# Patient Record
Sex: MALE | Race: WHITE | NOT HISPANIC OR LATINO
[De-identification: names, ages, dates, MRNs, and addresses within clinical notes are randomized per-mention and may not be internally consistent; named-entity substitution may affect disease eponyms.]

---

## 2019-11-27 ENCOUNTER — APPOINTMENT (OUTPATIENT)
Dept: ORTHOPEDIC SURGERY | Facility: CLINIC | Age: 49
End: 2019-11-27
Payer: COMMERCIAL

## 2019-11-27 VITALS — DIASTOLIC BLOOD PRESSURE: 90 MMHG | SYSTOLIC BLOOD PRESSURE: 160 MMHG | HEART RATE: 77 BPM

## 2019-11-27 VITALS
HEIGHT: 70 IN | SYSTOLIC BLOOD PRESSURE: 165 MMHG | BODY MASS INDEX: 35.79 KG/M2 | HEART RATE: 83 BPM | DIASTOLIC BLOOD PRESSURE: 92 MMHG | WEIGHT: 250 LBS

## 2019-11-27 DIAGNOSIS — S99.921A UNSPECIFIED INJURY OF RIGHT FOOT, INITIAL ENCOUNTER: ICD-10-CM

## 2019-11-27 PROCEDURE — 99203 OFFICE O/P NEW LOW 30 MIN: CPT

## 2019-11-27 NOTE — PHYSICAL EXAM
[de-identified] : Extremity: +Equinus (releases) R LE, soft tissue swelling R hallux with ecchymosis and associated tenderness plantar aspect first MTP joint, flickers FHL and EHL R hallux, stable varus / valgus stress testing first MTP joint.  Nontender R ankle, peroneals, syndesmosis, Achilles, hindfoot ST, midfoot LF and PTT insertional, and remainder of forefoot.  Stable Drawer testing R ankle, 5 / 5 evertor strength R ankle, calves soft and nontender, sensorimotor unchanged, skin intact as aforementioned R LE.  AOx3, mood / affect normal. [de-identified] : Xray R foot (11/26/19 by report): Impression: lucency distal phalanx first digit. This is in favor of being projectional. Fx is felt unlikely. If however the patient has pain localized to this area, follow-up study in 7 days may prove helpful [by review: bipartite lateral sesamoid, chronic deformity DP second digit R forefoot].
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
## 2019-11-27 NOTE — DISCUSSION/SUMMARY
[de-identified] : Discussed with patient potential nature of condition, course / sequelae, options reviewed.  Cam boot immobilization and Cam boot ambulation, activity modification, HEP, MRI assessment turf toe injury rule-out tear / bony impaction.  Return to office in 1 week / PRN, advanced imaging study review.  All questions answered.
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
## 2019-12-02 ENCOUNTER — FORM ENCOUNTER (OUTPATIENT)
Age: 49
End: 2019-12-02

## 2019-12-03 ENCOUNTER — OUTPATIENT (OUTPATIENT)
Dept: OUTPATIENT SERVICES | Facility: HOSPITAL | Age: 49
LOS: 1 days | End: 2019-12-03
Payer: COMMERCIAL

## 2019-12-03 ENCOUNTER — APPOINTMENT (OUTPATIENT)
Dept: MRI IMAGING | Facility: IMAGING CENTER | Age: 49
End: 2019-12-03
Payer: COMMERCIAL

## 2019-12-03 DIAGNOSIS — S99.921A UNSPECIFIED INJURY OF RIGHT FOOT, INITIAL ENCOUNTER: ICD-10-CM

## 2019-12-03 PROCEDURE — 73718 MRI LOWER EXTREMITY W/O DYE: CPT

## 2019-12-03 PROCEDURE — 73718 MRI LOWER EXTREMITY W/O DYE: CPT | Mod: 26,RT

## 2019-12-10 ENCOUNTER — APPOINTMENT (OUTPATIENT)
Dept: ORTHOPEDIC SURGERY | Facility: CLINIC | Age: 49
End: 2019-12-10
Payer: COMMERCIAL

## 2019-12-10 PROCEDURE — 99213 OFFICE O/P EST LOW 20 MIN: CPT

## 2019-12-15 NOTE — DISCUSSION/SUMMARY
[de-identified] : Options reviewed, Cam boot immobilization at all times and Cam boot ambulation again instructed, activity modification, HEP.  Return to office in 3 - 4 weeks / PRN, all questions answered.

## 2019-12-15 NOTE — HISTORY OF PRESENT ILLNESS
[FreeTextEntry1] : Follow-up R great toe hyperextension / turf toe injury (11/26/19), wearing sneakers for ambulation NC.

## 2019-12-15 NOTE — PHYSICAL EXAM
[Normal] : Oriented to person, place, and time, insight and judgement were intact and the affect was normal [de-identified] : Extremity: +Equinus (releases) R LE, decreased residual soft tissue swelling R hallux and decreased tenderness plantar aspect first MTP joint, flickers FHL and EHL R foot, stable varus / valgus stress testing first MTP joint. Nontender R ankle, peroneals, syndesmosis, Achilles, hindfoot ST, midfoot LF and PTT insertional, and remainder of forefoot. Stable Drawer testing R ankle, 5 / 5 evertor strength R ankle, calves soft and nontender, sensorimotor unchanged, skin intact as aforementioned R LE.  AOx3, mood / affect normal. [de-identified] : DAVID, NAD [de-identified] : MRI (by report 12/3/19): findings suggestive of rupture of sesamoidal - phalangeal ligaments of the first MTP joint distal to both sesamoids, capsular strain, split tearing of the flexor hallucis tendon with tenosynovitis, first MTP joint capsular strain, bipartite fibular sesamoid, small focus of deep chondral fissuring at the first metatarsal head, small first webspace bursitis R forefoot.

## 2019-12-31 ENCOUNTER — APPOINTMENT (OUTPATIENT)
Dept: ORTHOPEDIC SURGERY | Facility: CLINIC | Age: 49
End: 2019-12-31
Payer: COMMERCIAL

## 2019-12-31 PROCEDURE — 99213 OFFICE O/P EST LOW 20 MIN: CPT

## 2020-01-30 ENCOUNTER — APPOINTMENT (OUTPATIENT)
Dept: ORTHOPEDIC SURGERY | Facility: CLINIC | Age: 50
End: 2020-01-30
Payer: COMMERCIAL

## 2020-01-30 DIAGNOSIS — S93.529D SPRAIN OF METATARSOPHALANGEAL JOINT OF UNSPECIFIED TOE(S), SUBSEQUENT ENCOUNTER: ICD-10-CM

## 2020-01-30 DIAGNOSIS — M21.6X1 OTHER ACQUIRED DEFORMITIES OF RIGHT FOOT: ICD-10-CM

## 2020-01-30 PROCEDURE — 99213 OFFICE O/P EST LOW 20 MIN: CPT

## 2020-03-30 ENCOUNTER — APPOINTMENT (OUTPATIENT)
Dept: ORTHOPEDIC SURGERY | Facility: CLINIC | Age: 50
End: 2020-03-30

## 2020-05-26 ENCOUNTER — APPOINTMENT (OUTPATIENT)
Dept: ORTHOPEDIC SURGERY | Facility: CLINIC | Age: 50
End: 2020-05-26

## 2020-06-09 ENCOUNTER — APPOINTMENT (OUTPATIENT)
Dept: ORTHOPEDIC SURGERY | Facility: CLINIC | Age: 50
End: 2020-06-09

## 2021-09-09 ENCOUNTER — APPOINTMENT (OUTPATIENT)
Dept: BARIATRICS/WEIGHT MGMT | Facility: CLINIC | Age: 51
End: 2021-09-09
Payer: COMMERCIAL

## 2021-09-09 ENCOUNTER — TRANSCRIPTION ENCOUNTER (OUTPATIENT)
Age: 51
End: 2021-09-09

## 2021-09-09 PROCEDURE — 99205 OFFICE O/P NEW HI 60 MIN: CPT | Mod: 95

## 2021-09-09 NOTE — ASSESSMENT
[FreeTextEntry1] : -counseling provided at length\par -is interested in fasting, some dietary changes\par -will start GLP1\par -begin some activity once momentum starts\par -fasting guide provided\par -sleep study\par \par Bariatric surgery history: none\par Overweight / obesity comorbidities: hld jayme htn\par Anti-obesity medications: none\par Obesity medication side effects: n/a\par
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
## 2021-09-23 ENCOUNTER — APPOINTMENT (OUTPATIENT)
Dept: BARIATRICS/WEIGHT MGMT | Facility: CLINIC | Age: 51
End: 2021-09-23
Payer: COMMERCIAL

## 2021-09-23 PROCEDURE — 99213 OFFICE O/P EST LOW 20 MIN: CPT | Mod: 95

## 2021-09-24 NOTE — ASSESSMENT
[FreeTextEntry1] : -increase ozempic after 4 doses\par -continue lifestyle changes\par -difficult to touch upon food today\par -activity is a must\par -needs sleep study, we discussed\par \par Bariatric surgery history: none\par Overweight / obesity comorbidities: htn hld jayme\par Anti-obesity medications: ozempic\par Obesity medication side effects: none\par

## 2021-09-24 NOTE — HISTORY OF PRESENT ILLNESS
[Home] : at home, [unfilled] , at the time of the visit. [Other Location: e.g. Home (Enter Location, City,State)___] : at [unfilled] [Verbal consent obtained from patient] : the patient, [unfilled] [FreeTextEntry1] : Patient presents for weight loss and overweight/obese comorbidity management\par \par Mihir is feeling good on ozempic\par he is fitting into old clothes\par has not weighed self\par diet is lower in fat and higher in vegetables\par has not increased activity\par is asking about dose increase on ozempic\par feels the effect has worn off by end of week\par no side effects\par \par Due to comorbidities this patient requires medical treatment for overweight / obesity\par

## 2021-10-13 ENCOUNTER — APPOINTMENT (OUTPATIENT)
Dept: BARIATRICS/WEIGHT MGMT | Facility: CLINIC | Age: 51
End: 2021-10-13
Payer: COMMERCIAL

## 2021-10-13 PROCEDURE — 99443: CPT

## 2021-10-18 NOTE — HISTORY OF PRESENT ILLNESS
[Home] : at home, [unfilled] , at the time of the visit. [Other Location: e.g. Home (Enter Location, City,State)___] : at [unfilled] [Verbal consent obtained from patient] : the patient, [unfilled] [FreeTextEntry1] : Patient presents for weight loss and overweight/obese comorbidity management\par \par Mihir has lost ~12 lbs\par he has reduced fat intake\par specific details are not very clear\par has not started exercise yet\par feels the effect of semaglutide waning but working\par reports regular indiscretions with diet due to holidays\par made appointment with sleep medicine for follow up study\par \par Due to comorbidities this patient requires medical treatment for overweight / obesity\par

## 2021-10-18 NOTE — ASSESSMENT
[FreeTextEntry1] : -will continue pharmacological treatment; switching to wegovy if possible\par -need to discuss diet and lifestyle changes in more detail; motivation may be low; free time may be a factor\par \par Bariatric surgery history: none\par Overweight / obesity comorbidities: htn hld jayme\par Anti-obesity medications: semaglutide\par Obesity medication side effects: none\par

## 2021-11-10 ENCOUNTER — APPOINTMENT (OUTPATIENT)
Dept: BARIATRICS/WEIGHT MGMT | Facility: CLINIC | Age: 51
End: 2021-11-10
Payer: COMMERCIAL

## 2021-11-10 DIAGNOSIS — R06.83 SNORING: ICD-10-CM

## 2021-11-10 PROCEDURE — 99214 OFFICE O/P EST MOD 30 MIN: CPT | Mod: 95

## 2021-11-10 NOTE — ASSESSMENT
[FreeTextEntry1] : -continue wegovy\par -add exercise - 2x per week eliptical\par -weight self for feedback\par -will discuss diet in more detail next month\par \par Bariatric surgery history: none\par Overweight / obesity comorbidities: htn hld\par Anti-obesity medications: wegovy\par Obesity medication side effects: none\par

## 2021-11-10 NOTE — HISTORY OF PRESENT ILLNESS
[Home] : at home, [unfilled] , at the time of the visit. [Other Location: e.g. Home (Enter Location, City,State)___] : at [unfilled] [Verbal consent obtained from patient] : the patient, [unfilled] [FreeTextEntry1] : Patient presents for weight loss and overweight/obese comorbidity management\par \par Mihir has reduced portions with help of semaglutide\par just started at higher dose 1.0 now wegovy\par plans to start exercise routine this month\par has elipitcal machine\par wants to drill down about diet next month\par still needs to schedule sleep study\par \par Due to comorbidities this patient requires medical treatment for overweight / obesity\par

## 2021-12-01 RX ORDER — SEMAGLUTIDE 1.34 MG/ML
2 INJECTION, SOLUTION SUBCUTANEOUS
Qty: 1 | Refills: 2 | Status: DISCONTINUED | COMMUNITY
Start: 2021-09-14 | End: 2021-12-01

## 2021-12-15 ENCOUNTER — APPOINTMENT (OUTPATIENT)
Dept: BARIATRICS/WEIGHT MGMT | Facility: CLINIC | Age: 51
End: 2021-12-15
Payer: COMMERCIAL

## 2021-12-15 PROCEDURE — 99214 OFFICE O/P EST MOD 30 MIN: CPT | Mod: 95

## 2021-12-16 NOTE — ASSESSMENT
[FreeTextEntry1] : -increasing wegovy to 1.7, as desired\par -reviewed explicitly concerns about taking medications without making other lifestyle changes\par -Mihir reports taking his time with these other measures\par -I stressed again the importance of a sleep study\par \par Bariatric surgery history: none\par Overweight / obesity comorbidities: htn jayme\par Anti-obesity medications: wegovy\par Obesity medication side effects: none\par

## 2021-12-16 NOTE — HISTORY OF PRESENT ILLNESS
[Home] : at home, [unfilled] , at the time of the visit. [Other Location: e.g. Home (Enter Location, City,State)___] : at [unfilled] [Verbal consent obtained from patient] : the patient, [unfilled] [FreeTextEntry1] : Patient presents for weight loss and overweight/obese comorbidity management\par \par Mihir has lost 8 lbs being on wegovy 1.0\par has improved diet somewhat, although exact details not forthcoming\par asks about higher dose of ozempic\par still has not gotten sleep study\par exercise has not yet begun\par \par Due to comorbidities this patient requires medical treatment for overweight / obesity\par

## 2022-01-12 ENCOUNTER — APPOINTMENT (OUTPATIENT)
Dept: BARIATRICS/WEIGHT MGMT | Facility: CLINIC | Age: 52
End: 2022-01-12
Payer: COMMERCIAL

## 2022-01-12 PROCEDURE — 99214 OFFICE O/P EST MOD 30 MIN: CPT | Mod: 95

## 2022-01-12 NOTE — HISTORY OF PRESENT ILLNESS
[Home] : at home, [unfilled] , at the time of the visit. [Other Location: e.g. Home (Enter Location, City,State)___] : at [unfilled] [FreeTextEntry1] : Patient presents for weight loss and overweight/obese comorbidity management\par \par Mihir has lost a total of 17 lbs\par reports holidays were tough\par did not gain\par not yet exercising\par and reports eating chinese food and not having changed diet that drastically, just eating less\par has not yet gotten sleep study\par \par Due to comorbidities this patient requires medical treatment for overweight / obesity\par

## 2022-01-12 NOTE — ASSESSMENT
[FreeTextEntry1] : -we discussed the importance of exercise and saturated fat reduction, insulin resistance\par -holding wegovy for now\par -work on lifestyle measures, slow steady progress\par -close follow up still\par -continue to strongly advise getting sleep study, suspect jayme\par \par Bariatric surgery history: none\par Overweight / obesity comorbidities: htn hld\par Anti-obesity medications: wegovy\par Obesity medication side effects: none\par

## 2022-02-09 ENCOUNTER — APPOINTMENT (OUTPATIENT)
Dept: BARIATRICS/WEIGHT MGMT | Facility: CLINIC | Age: 52
End: 2022-02-09
Payer: COMMERCIAL

## 2022-02-09 PROCEDURE — 99213 OFFICE O/P EST LOW 20 MIN: CPT | Mod: 95

## 2022-02-09 NOTE — ASSESSMENT
[FreeTextEntry1] : -continue wegovy at current dose\par -will discuss medical options in future\par -will discuss lifestyle measures when Mihir is ready\par -I'll continue to monitor mainly for medicaiton side effects\par \par Bariatric surgery history: none\par Overweight / obesity comorbidities: hld htn jayme\par Anti-obesity medications: wegovy\par Obesity medication side effects: nausea\par

## 2022-02-09 NOTE — HISTORY OF PRESENT ILLNESS
[Home] : at home, [unfilled] , at the time of the visit. [Other Location: e.g. Home (Enter Location, City,State)___] : at [unfilled] [Verbal consent obtained from patient] : the patient, [unfilled] [FreeTextEntry1] : Patient presents for weight loss and overweight/obese comorbidity management\par \par Mihir has lost 25 lbs\par has been mainly through reduction in calorie intake from wegovy\par \par wegovy working well, however some severe nausea\par \par he is aware of long term plan requiring additional measures, like lifestyle\par has questions about additional medications for weight loss\par prefers to continue to treat medically and eventually get to other changes\par  he is aware of my take that this is not the recommended approach\par \par Due to comorbidities this patient requires medical treatment for overweight / obesity\par

## 2022-03-09 ENCOUNTER — APPOINTMENT (OUTPATIENT)
Dept: BARIATRICS/WEIGHT MGMT | Facility: CLINIC | Age: 52
End: 2022-03-09
Payer: COMMERCIAL

## 2022-03-09 PROCEDURE — 99214 OFFICE O/P EST MOD 30 MIN: CPT | Mod: 95

## 2022-03-09 NOTE — HISTORY OF PRESENT ILLNESS
[Home] : at home, [unfilled] , at the time of the visit. [Other Location: e.g. Home (Enter Location, City,State)___] : at [unfilled] [Verbal consent obtained from patient] : the patient, [unfilled] [FreeTextEntry1] : Patient presents for weight loss and overweight/obese comorbidity management\par \par Mihir reports still strong appetite control\par on full dose ozempic, eating 1/2 portion used to\par diet has not changed in composition\par has not yet started regular activity/exercise\par sleep study deferred by Mihir for now\par \par Due to comorbidities this patient requires medical treatment for overweight / obesity\par

## 2022-03-09 NOTE — ASSESSMENT
[FreeTextEntry1] : The follow plan was devised in discussion with the patient. This plan addresses this patient's overweight / obesity as well as the following medical comorbidities of overweight / obesity: hypertension hyperlipidemia \par -continue wegovy 2.4 mg weekly, will reassess next month\par -I will continue to urge lifestyle changes, and sleep study\par -may consider additional medications \par \par Bariatric surgery history: none\par Overweight / obesity comorbidities: hypertension hyperlipidemia \par Anti-obesity medications: wegovy\par Obesity medication side effects: nausea\par

## 2022-05-04 ENCOUNTER — APPOINTMENT (OUTPATIENT)
Dept: BARIATRICS/WEIGHT MGMT | Facility: CLINIC | Age: 52
End: 2022-05-04
Payer: COMMERCIAL

## 2022-05-04 PROCEDURE — 99214 OFFICE O/P EST MOD 30 MIN: CPT | Mod: 95

## 2022-05-06 NOTE — HISTORY OF PRESENT ILLNESS
[Home] : at home, [unfilled] , at the time of the visit. [Other Location: e.g. Home (Enter Location, City,State)___] : at [unfilled] [Verbal consent obtained from patient] : the patient, [unfilled] [FreeTextEntry1] : Patient presents for weight loss and overweight/obese comorbidity management\par \par Mihir has maintained ~30 lb weight loss\par now reach a plateau\par so far diet and exercise changes have been minimal\par has been more healthful over past week, vegetables, salmon\par asks about additional medications\par tolerating wegovy 2.7 mg, effect is waning\par \par still plans to get sleep study\par \par Due to comorbidities this patient requires medical treatment for overweight / obesity\par

## 2022-05-06 NOTE — ASSESSMENT
[FreeTextEntry1] : The following plan was agreed upon in discussion with this patient. This plan addresses this patient's overweight / obesity as well as the following medical comorbidities of overweight / obesity: hypertension, hyperlipidemia, obstructive sleep apnea \par -reviewed nutrition concepts again\par -adding phentermine/topiramate\par I discussed with the patient the indications, contraindications, insurance coverage issues, risks, benefits, alternatives, adverse effects / side effects, rare adverse events, reasons to stop immediately, reasons to go to the emergency room; also I have provided this patient my phone number to call if anything concerning occurs; the patient stated understanding of these issues\par -add exercise\par -get sleep study\par \par Bariatric surgery history: none\par Overweight / obesity comorbidities:  hypertension, hyperlipidemia, obstructive sleep apnea \par Anti-obesity medications: wegovy\par Obesity medication side effects: none\par \par

## 2022-05-18 ENCOUNTER — APPOINTMENT (OUTPATIENT)
Dept: BARIATRICS/WEIGHT MGMT | Facility: CLINIC | Age: 52
End: 2022-05-18
Payer: COMMERCIAL

## 2022-05-18 PROCEDURE — 99214 OFFICE O/P EST MOD 30 MIN: CPT | Mod: 95

## 2022-05-19 NOTE — HISTORY OF PRESENT ILLNESS
[Home] : at home, [unfilled] , at the time of the visit. [Other Location: e.g. Home (Enter Location, City,State)___] : at [unfilled] [Verbal consent obtained from patient] : the patient, [unfilled] [FreeTextEntry1] : Patient presents for weight loss and overweight/obese comorbidity management\par \par Mihir reports 30 lb weight loss\par a few addition lbs loss since last visit\par tolerating wegovy and phentermine, topiramate\par aim is another 20 lb weight loss prior to upcoming family event\par diet and other lifestyle changes have been limited\par has not obtained sleep study\par is aware that additional measures besides meds are needed to reach his goalw\par \par Due to comorbidities this patient requires medical treatment for overweight / obesity\par

## 2022-05-19 NOTE — ASSESSMENT
[FreeTextEntry1] : The following plan was agreed upon in discussion with this patient. This plan addresses this patient's overweight / obesity as well as the following medical comorbidities of overweight / obesity: hypertension hyperlipidemia\par -asked to check blood pressure at home while on phentermine\par -increase phentermine and topiramate to 37.5 / 50 mg\par -advised add dietary changes and activity to reach goal\par -still encouraging follow up with sleep study\par \par Bariatric surgery history: none\par Overweight / obesity comorbidities: hypertension hyperlipidemia\par Anti-obesity medications: wegovy phentermine topiramate\par Obesity medication side effects: none\par \par

## 2022-06-15 ENCOUNTER — APPOINTMENT (OUTPATIENT)
Dept: BARIATRICS/WEIGHT MGMT | Facility: CLINIC | Age: 52
End: 2022-06-15
Payer: COMMERCIAL

## 2022-06-15 PROCEDURE — 99214 OFFICE O/P EST MOD 30 MIN: CPT | Mod: 95

## 2022-06-20 NOTE — ASSESSMENT
[FreeTextEntry1] : The following plan was agreed upon in discussion with this patient. This plan addresses this patient's overweight / obesity as well as the following medical comorbidities of overweight / obesity: hypertension, hyperlipidemia\par -continue wegovy, phen/tpm\par -new resources provided re: processed food, insulin resistance\par -encouraged continuing lifestyle changes\par \par Bariatric surgery history: none\par Overweight / obesity comorbidities: hypertension, hyperlipidemia\par Anti-obesity medications: wegovy qsymia\par Obesity medication side effects: none\par

## 2022-06-20 NOTE — HISTORY OF PRESENT ILLNESS
[Home] : at home, [unfilled] , at the time of the visit. [Other Location: e.g. Home (Enter Location, City,State)___] : at [unfilled] [Verbal consent obtained from patient] : the patient, [unfilled] [FreeTextEntry1] : Patient presents for weight loss and overweight/obese comorbidity management\par \par Mihir has maintained ~30 lb weight loss\par by dietary changes and wegovy, phen/tpm\par further lifestyle changes have been limited at this time\par tolerating medication with no side effects\par \par Due to comorbidities this patient requires medical treatment for overweight / obesity\par

## 2022-07-20 ENCOUNTER — APPOINTMENT (OUTPATIENT)
Dept: BARIATRICS/WEIGHT MGMT | Facility: CLINIC | Age: 52
End: 2022-07-20

## 2022-07-20 DIAGNOSIS — R11.0 NAUSEA: ICD-10-CM

## 2022-07-20 PROCEDURE — 99214 OFFICE O/P EST MOD 30 MIN: CPT | Mod: 95

## 2022-07-22 NOTE — HISTORY OF PRESENT ILLNESS
[Home] : at home, [unfilled] , at the time of the visit. [Other Location: e.g. Home (Enter Location, City,State)___] : at [unfilled] [Verbal consent obtained from patient] : the patient, [unfilled] [FreeTextEntry1] : Patient presents for weight loss and overweight/obese comorbidity management\par \par Mihir continues to lose weight, now ~35 lb weight loss\par dietary changes have been modest but improving\par has not dedicated exercise yet\par generally tolerating phen/tpm and wegovy at max doses\par severe nausea on first day of wegovy, sometimes emesis\par inquires about new WL med terzipatide\par \par Due to comorbidities this patient requires medical treatment for overweight / obesity\par

## 2022-07-22 NOTE — ASSESSMENT
[FreeTextEntry1] : The following plan was agreed upon in discussion with this patient. This plan addresses this patient's overweight / obesity as well as the following medical comorbidities of overweight / obesity: hypertension hyperlipidemia obstructive sleep apnea\par \par -zofran for nausea\par -discuss terzipatide at next visit\par -continue lifestyle changes; new educational resources provided\par \par Bariatric surgery history: none\par Overweight / obesity comorbidities: hypertension hyperlipidemia obstructive sleep apnea\par Anti-obesity medications: wegovy phen tpm\par Obesity medication side effects: nausea\par

## 2022-08-17 ENCOUNTER — APPOINTMENT (OUTPATIENT)
Dept: BARIATRICS/WEIGHT MGMT | Facility: CLINIC | Age: 52
End: 2022-08-17

## 2022-08-17 PROCEDURE — 99214 OFFICE O/P EST MOD 30 MIN: CPT | Mod: 95

## 2022-08-17 RX ORDER — SEMAGLUTIDE 2.4 MG/.75ML
2.4 INJECTION, SOLUTION SUBCUTANEOUS
Qty: 1 | Refills: 5 | Status: DISCONTINUED | COMMUNITY
Start: 2021-10-13 | End: 2022-08-17

## 2022-08-17 NOTE — ASSESSMENT
[FreeTextEntry1] : The following plan was agreed upon in discussion with this patient. This plan addresses this patient's overweight / obesity as well as the following medical comorbidities of overweight / obesity: hyperlipidemia, hypertension, obstructive sleep apnea\par \par -continue wegovy 2.4; will aim to switch to mounjaro\par -new diet and educational resources provided\par -exercise resources and suggestions provided\par -counseling about physical activity and insulin resistance was done\par \par Bariatric surgery history: none\par Overweight / obesity comorbidities:hyperlipidemia, hypertension, obstructive sleep apnea\par Current anti-obesity medications: wegovy phentermine topiramate\par Obesity medication side effects: nausea\par \par

## 2022-08-17 NOTE — HISTORY OF PRESENT ILLNESS
[Home] : at home, [unfilled] , at the time of the visit. [Other Location: e.g. Home (Enter Location, City,State)___] : at [unfilled] [Verbal consent obtained from patient] : the patient, [unfilled] [FreeTextEntry1] : Patient presents for weight loss and overweight/obese comorbidity management\par \par Mihir continues to lose weight\par eating 1 large meal per day\par diet is low fat, unprocessed, chicken\par also eats veggie burgers\par open minded about vegan / vegetarian meals\par feels has no time for physical activity\par however wants to add in the next few months\par work busy, but would consider home exercises\par \par Due to comorbidities this patient requires medical treatment for overweight / obesity\par

## 2022-09-16 DIAGNOSIS — G47.00 INSOMNIA, UNSPECIFIED: ICD-10-CM

## 2022-10-14 ENCOUNTER — APPOINTMENT (OUTPATIENT)
Dept: BARIATRICS/WEIGHT MGMT | Facility: CLINIC | Age: 52
End: 2022-10-14

## 2022-10-28 ENCOUNTER — APPOINTMENT (OUTPATIENT)
Dept: BARIATRICS/WEIGHT MGMT | Facility: CLINIC | Age: 52
End: 2022-10-28

## 2022-10-28 VITALS — WEIGHT: 250 LBS | BODY MASS INDEX: 35.87 KG/M2

## 2022-10-28 PROCEDURE — 99214 OFFICE O/P EST MOD 30 MIN: CPT | Mod: 95

## 2022-10-31 NOTE — ASSESSMENT
[FreeTextEntry1] : The following plan was agreed upon in discussion with this patient. This plan addresses this patient's overweight / obesity as well as the following medical comorbidities of overweight / obesity: hypertension hyperlipidemia obstructive sleep apnea\par \par -increase mounjaro to 12.5 mg\par -nutrition concepts reviewed again\par -educational materials provided\par -emphasized long term goal of 50% of meals plant based\par -jared agreed to start with more salads\par -continue to discuss exercise\par \par Bariatric surgery history: none\par Overweight / obesity comorbidities: hypertension hyperlipidemia obstructive sleep apnea\par Current anti-obesity medications: mounjaro\par Obesity medication side effects: n/a\par \par

## 2022-10-31 NOTE — HISTORY OF PRESENT ILLNESS
[Home] : at home, [unfilled] , at the time of the visit. [Other Location: e.g. Home (Enter Location, City,State)___] : at [unfilled] [Verbal consent obtained from patient] : the patient, [unfilled] [FreeTextEntry1] : Patient presents for weight loss and overweight/obese comorbidity management\par \par Mihir feels little effect of the mounjaro 10 mg\par said was having stronger effect with wegovy 2.4 mg\par diet remains largely unchanged "eating a lot of meat"\par steak, fish chicken, other details not forthcoming\par has not started regular exercise, work is busy\par \par Due to comorbidities this patient requires medical treatment for overweight / obesity\par

## 2022-11-23 ENCOUNTER — APPOINTMENT (OUTPATIENT)
Dept: BARIATRICS/WEIGHT MGMT | Facility: CLINIC | Age: 52
End: 2022-11-23

## 2022-11-23 VITALS — BODY MASS INDEX: 34.44 KG/M2 | WEIGHT: 240 LBS

## 2022-11-23 PROCEDURE — 99214 OFFICE O/P EST MOD 30 MIN: CPT | Mod: 95

## 2022-11-23 RX ORDER — ONDANSETRON 4 MG/1
4 TABLET ORAL
Qty: 21 | Refills: 2 | Status: ACTIVE | COMMUNITY
Start: 2022-07-20 | End: 1900-01-01

## 2022-11-27 NOTE — HISTORY OF PRESENT ILLNESS
[Home] : at home, [unfilled] , at the time of the visit. [Other Location: e.g. Home (Enter Location, City,State)___] : at [unfilled] [Verbal consent obtained from patient] : the patient, [unfilled] [FreeTextEntry1] : Patient presents for weight loss and overweight/obese comorbidity management\par \par Mihir has lost another 10 lbs\par mounjaro is working well however says effect is less intense\par diet remains a work in progress, details aren't specific\par exercise has not yet taken off yet\par \par Due to comorbidities this patient requires medical treatment for overweight / obesity\par

## 2022-12-21 ENCOUNTER — APPOINTMENT (OUTPATIENT)
Dept: BARIATRICS/WEIGHT MGMT | Facility: CLINIC | Age: 52
End: 2022-12-21

## 2022-12-21 PROCEDURE — 99214 OFFICE O/P EST MOD 30 MIN: CPT | Mod: 95

## 2022-12-21 RX ORDER — ZOLPIDEM TARTRATE 6.25 MG/1
6.25 TABLET, EXTENDED RELEASE ORAL
Qty: 10 | Refills: 0 | Status: ACTIVE | COMMUNITY
Start: 2022-12-21 | End: 1900-01-01

## 2022-12-21 RX ORDER — PHENTERMINE HYDROCHLORIDE 37.5 MG/1
37.5 TABLET ORAL
Qty: 30 | Refills: 2 | Status: DISCONTINUED | COMMUNITY
Start: 2022-05-04 | End: 2022-12-21

## 2022-12-22 NOTE — HISTORY OF PRESENT ILLNESS
[Home] : at home, [unfilled] , at the time of the visit. [Other Location: e.g. Home (Enter Location, City,State)___] : at [unfilled] [Verbal consent obtained from patient] : the patient, [unfilled] [FreeTextEntry1] : Patient presents for weight loss and overweight/obese comorbidity management\par \par Mihir continues to see progress\par is eating better foods\par some insomnia with the increase in mounjaro\par meds are working well\par is still taking phentermine\par interested in continuing to work on diet\par weight today was not discussed\par \par Due to comorbidities this patient requires medical treatment for overweight / obesity\par

## 2022-12-22 NOTE — ASSESSMENT
[FreeTextEntry1] : The following plan was agreed upon in discussion with this patient. This plan addresses this patient's overweight / obesity as well as the following medical comorbidities of overweight / obesity: hyperlipidemia hypeprtension obstructive sleep apnea\par \par -stop phentermine, as we had discussed at prior visit\par -increase trazodone to 100 mg nightly\par -continue mounjaro topiramate\par -new recipe, cooking and educational resources provided\par \par Bariatric surgery history: none\par Overweight / obesity comorbidities: hyperlipidemia hypertension obstructive sleep apnea\par Current anti-obesity medications: mounjaro phentermine topiramate\par Obesity medication side effects: insomnia\par \par

## 2023-01-25 ENCOUNTER — APPOINTMENT (OUTPATIENT)
Dept: BARIATRICS/WEIGHT MGMT | Facility: CLINIC | Age: 53
End: 2023-01-25
Payer: COMMERCIAL

## 2023-01-25 PROCEDURE — 99214 OFFICE O/P EST MOD 30 MIN: CPT | Mod: 95

## 2023-01-30 NOTE — HISTORY OF PRESENT ILLNESS
[Home] : at home, [unfilled] , at the time of the visit. [Other Location: e.g. Home (Enter Location, City,State)___] : at [unfilled] [Verbal consent obtained from patient] : the patient, [unfilled] [FreeTextEntry1] : Patient presents for weight loss and overweight/obese comorbidity management\par \par Mihir is weight stable currently\par diet remains the same and is still inactive\par would like to switch to wegovy\par not eliciting of lifestyle counseling\par \par Due to comorbidities this patient requires medical treatment for overweight / obesity\par

## 2023-01-30 NOTE — ASSESSMENT
[FreeTextEntry1] : The following plan was agreed upon in discussion with this patient. This plan addresses this patient's overweight / obesity as well as the following medical comorbidities of overweight / obesity: hyperlipidemia obstructive sleep apnea hypertension\par \par -stop mounjaro\par -start wegovy 2.4 mg\par -counseled briefly on diet\par -new educational and motivational materials provided\par \par Bariatric surgery history: none\par Overweight / obesity comorbidities:: hyperlipidemia obstructive sleep apnea hypertension\par Current anti-obesity medications: mounjaro\par Obesity medication side effects: none\par \par

## 2023-02-22 ENCOUNTER — APPOINTMENT (OUTPATIENT)
Dept: BARIATRICS/WEIGHT MGMT | Facility: CLINIC | Age: 53
End: 2023-02-22
Payer: COMMERCIAL

## 2023-02-22 VITALS — BODY MASS INDEX: 31.57 KG/M2 | WEIGHT: 220 LBS

## 2023-02-22 PROCEDURE — 99214 OFFICE O/P EST MOD 30 MIN: CPT | Mod: 95

## 2023-02-24 NOTE — HISTORY OF PRESENT ILLNESS
[Home] : at home, [unfilled] , at the time of the visit. [Other Location: e.g. Home (Enter Location, City,State)___] : at [unfilled] [Verbal consent obtained from patient] : the patient, [unfilled] [FreeTextEntry1] : Patient presents for weight loss and overweight/obese comorbidity management\par \par doing well on ketogenic diet now\par has lost 10 lbs so far\par has been doing for 6 weeks now\par "i'm a carnivore"\par friend is a support system\par feeling energized and sleeping better\par has a few questions about keto\par \par Due to comorbidities this patient requires medical treatment for overweight / obesity\par

## 2023-02-24 NOTE — ASSESSMENT
[FreeTextEntry1] : Bariatric surgery history: none\par Overweight / obesity comorbidities: hypertension hyperlipidemia jayme\par Current anti-obesity medications: mounjaro\par Obesity medication side effects: none\par \par cont mounjaro 15\par counseled on keto briefly\par shared some unknowns that exist for keto - long term health effects? muscle loss insulin resistance\par shared resources from keto \par 1 month follow up\par

## 2023-03-22 ENCOUNTER — APPOINTMENT (OUTPATIENT)
Dept: BARIATRICS/WEIGHT MGMT | Facility: CLINIC | Age: 53
End: 2023-03-22
Payer: COMMERCIAL

## 2023-03-22 VITALS — WEIGHT: 248 LBS | BODY MASS INDEX: 35.58 KG/M2

## 2023-03-22 PROCEDURE — 99214 OFFICE O/P EST MOD 30 MIN: CPT | Mod: 95

## 2023-03-22 RX ORDER — TIRZEPATIDE 15 MG/.5ML
15 INJECTION, SOLUTION SUBCUTANEOUS
Qty: 2 | Refills: 5 | Status: DISCONTINUED | COMMUNITY
Start: 2022-08-17 | End: 2023-03-22

## 2023-03-22 NOTE — ASSESSMENT
[FreeTextEntry1] : Bariatric surgery history: none\par Overweight / obesity comorbidities: htn hld jayme\par Current anti-obesity medications: mounjaro\par Obesity medication side effects: none\par \par -start wegovy 2.4 mg\par -stop mounjaro\par -supportive counseling provided\par -recommended weight self\par -continue high fiber vegetables with meals\par -continue  sessions\par

## 2023-04-26 ENCOUNTER — APPOINTMENT (OUTPATIENT)
Dept: BARIATRICS/WEIGHT MGMT | Facility: CLINIC | Age: 53
End: 2023-04-26
Payer: COMMERCIAL

## 2023-04-26 PROCEDURE — 99214 OFFICE O/P EST MOD 30 MIN: CPT | Mod: 95

## 2023-04-26 NOTE — HISTORY OF PRESENT ILLNESS
[Home] : at home, [unfilled] , at the time of the visit. [Other Location: e.g. Home (Enter Location, City,State)___] : at [unfilled] [Verbal consent obtained from patient] : the patient, [unfilled] [FreeTextEntry1] : Patient presents for weight loss and overweight/obese comorbidity management\par \par jared reports doing well\par weight is stable but no recent measurement\par clothes fitting looser\par continuing ketogenic diet\par \par Due to comorbidities this patient requires medical treatment for overweight / obesity\par

## 2023-04-26 NOTE — ASSESSMENT
[FreeTextEntry1] : Bariatric surgery history: none\par Overweight / obesity comorbidities: hld jayme\par Current anti-obesity medications: wegovy phentermine topiramate\par Obesity medication side effects: none\par \par -cont wegovy, phentermine topiramate\par -continue lifestyle efforts\par

## 2023-05-31 ENCOUNTER — APPOINTMENT (OUTPATIENT)
Dept: BARIATRICS/WEIGHT MGMT | Facility: CLINIC | Age: 53
End: 2023-05-31
Payer: COMMERCIAL

## 2023-05-31 PROCEDURE — 99214 OFFICE O/P EST MOD 30 MIN: CPT | Mod: 95

## 2023-05-31 RX ORDER — TOPIRAMATE 50 MG/1
50 TABLET, FILM COATED ORAL
Qty: 60 | Refills: 5 | Status: ACTIVE | COMMUNITY
Start: 2022-05-04 | End: 1900-01-01

## 2023-05-31 NOTE — HISTORY OF PRESENT ILLNESS
[Home] : at home, [unfilled] , at the time of the visit. [Other Location: e.g. Home (Enter Location, City,State)___] : at [unfilled] [Verbal consent obtained from patient] : the patient, [unfilled] [FreeTextEntry1] : Patient presents for weight loss and overweight/obese comorbidity management\par \par doing well reports 38 lb weight loss from ~270 now to ~230\par continues keto and is active with walking daily\par would like to try fasting therapy as well\par medications working well\par \par Due to comorbidities this patient requires medical treatment for overweight / obesity\par

## 2023-05-31 NOTE — ASSESSMENT
[FreeTextEntry1] : Bariatric surgery history: none\par Overweight / obesity comorbidities: hld jayme htn\par Current anti-obesity medications: wegovy phentermine topiramate\par Obesity medication side effects: none\par \par -continue lifestyle efforts\par -fasting therapy resources shared\par -cont wegovy phentermine topiramate\par

## 2023-06-16 RX ORDER — TRAZODONE HYDROCHLORIDE 50 MG/1
50 TABLET ORAL
Qty: 60 | Refills: 5 | Status: ACTIVE | COMMUNITY
Start: 2022-09-16 | End: 1900-01-01

## 2023-06-17 ENCOUNTER — TRANSCRIPTION ENCOUNTER (OUTPATIENT)
Age: 53
End: 2023-06-17

## 2023-07-05 ENCOUNTER — APPOINTMENT (OUTPATIENT)
Dept: BARIATRICS/WEIGHT MGMT | Facility: CLINIC | Age: 53
End: 2023-07-05
Payer: COMMERCIAL

## 2023-07-05 PROCEDURE — 99214 OFFICE O/P EST MOD 30 MIN: CPT | Mod: 95

## 2023-07-05 NOTE — HISTORY OF PRESENT ILLNESS
[Home] : at home, [unfilled] , at the time of the visit. [Other Location: e.g. Home (Enter Location, City,State)___] : at [unfilled] [Verbal consent obtained from patient] : the patient, [unfilled] [FreeTextEntry1] : Patient presents for weight loss and overweight/obese comorbidity management\par \par Mihir reports some nausea on wegovy 2.4 and would like to decrease\par has some question about new medications and medication side effects\par following keto, however weight status not clear, does not weigh self\par reports pants looser\par reports walking more\par \par Due to comorbidities this patient requires medical treatment for overweight / obesity\par

## 2023-07-05 NOTE — ASSESSMENT
[FreeTextEntry1] : Bariatric surgery history: none\par Overweight / obesity comorbidities: hld htn jayme\par Current anti-obesity medications: wegovy phentermine topiramate\par Obesity medication side effects: nausea\par \par decrease wegovy to 1.7 mg, cont phen tpm\par continue efforts with lifestyle\par

## 2023-07-11 ENCOUNTER — TRANSCRIPTION ENCOUNTER (OUTPATIENT)
Age: 53
End: 2023-07-11

## 2023-07-12 ENCOUNTER — TRANSCRIPTION ENCOUNTER (OUTPATIENT)
Age: 53
End: 2023-07-12

## 2023-08-23 ENCOUNTER — TRANSCRIPTION ENCOUNTER (OUTPATIENT)
Age: 53
End: 2023-08-23

## 2023-08-30 ENCOUNTER — OUTPATIENT (OUTPATIENT)
Dept: OUTPATIENT SERVICES | Facility: HOSPITAL | Age: 53
LOS: 1 days | End: 2023-08-30
Payer: COMMERCIAL

## 2023-08-30 ENCOUNTER — APPOINTMENT (OUTPATIENT)
Dept: BARIATRICS/WEIGHT MGMT | Facility: CLINIC | Age: 53
End: 2023-08-30
Payer: COMMERCIAL

## 2023-08-30 VITALS — BODY MASS INDEX: 34.15 KG/M2 | WEIGHT: 238 LBS

## 2023-08-30 DIAGNOSIS — I10 ESSENTIAL (PRIMARY) HYPERTENSION: ICD-10-CM

## 2023-08-30 PROCEDURE — G0463: CPT

## 2023-08-30 PROCEDURE — 99214 OFFICE O/P EST MOD 30 MIN: CPT | Mod: 95

## 2023-08-30 RX ORDER — SEMAGLUTIDE 1.7 MG/.75ML
1.7 INJECTION, SOLUTION SUBCUTANEOUS
Qty: 1 | Refills: 5 | Status: DISCONTINUED | COMMUNITY
Start: 2023-01-25 | End: 2023-08-30

## 2023-08-30 NOTE — ASSESSMENT
[FreeTextEntry1] : Bariatric surgery history: none Overweight / obesity comorbidities: hld jayme htn Current anti-obesity medications: wegovy Obesity medication side effects: diarrhea  -counseled on importance of vitamins, fiber, on keto -encouraged leafy greens -advised intensive exercise with some regularity; Mihir plans to have  come to house -take 2 week break from wegovy, restart at 1 mg -6 week follow up

## 2023-08-30 NOTE — HISTORY OF PRESENT ILLNESS
[Home] : at home, [unfilled] , at the time of the visit. [Other Location: e.g. Home (Enter Location, City,State)___] : at [unfilled] [Verbal consent obtained from patient] : the patient, [unfilled] [FreeTextEntry1] : Patient presents for weight loss and overweight/obese comorbidity management  doing fine, has lost total 20 lbs continuing to follow keto some days difficult but overall enjoying eating a lot of leafy greens but not taking vitamins as is sometimes recommended for keto walking for activity but no dedicated exercise having regular diarrhea, sometimes severe on wegovy would like to adjust  Due to comorbidities this patient requires medical treatment for overweight / obesity

## 2023-09-08 DIAGNOSIS — E78.5 HYPERLIPIDEMIA, UNSPECIFIED: ICD-10-CM

## 2023-09-08 DIAGNOSIS — E66.9 OBESITY, UNSPECIFIED: ICD-10-CM

## 2023-09-08 DIAGNOSIS — G47.33 OBSTRUCTIVE SLEEP APNEA (ADULT) (PEDIATRIC): ICD-10-CM

## 2023-09-13 RX ORDER — SEMAGLUTIDE 1 MG/.5ML
1 INJECTION, SOLUTION SUBCUTANEOUS
Qty: 1 | Refills: 5 | Status: DISCONTINUED | COMMUNITY
Start: 2023-08-30 | End: 2023-09-13

## 2023-09-25 ENCOUNTER — TRANSCRIPTION ENCOUNTER (OUTPATIENT)
Age: 53
End: 2023-09-25

## 2023-10-18 ENCOUNTER — APPOINTMENT (OUTPATIENT)
Dept: BARIATRICS/WEIGHT MGMT | Facility: CLINIC | Age: 53
End: 2023-10-18
Payer: COMMERCIAL

## 2023-10-18 ENCOUNTER — OUTPATIENT (OUTPATIENT)
Dept: OUTPATIENT SERVICES | Facility: HOSPITAL | Age: 53
LOS: 1 days | End: 2023-10-18
Payer: COMMERCIAL

## 2023-10-18 DIAGNOSIS — E78.5 HYPERLIPIDEMIA, UNSPECIFIED: ICD-10-CM

## 2023-10-18 DIAGNOSIS — G47.33 OBSTRUCTIVE SLEEP APNEA (ADULT) (PEDIATRIC): ICD-10-CM

## 2023-10-18 DIAGNOSIS — E66.9 OBESITY, UNSPECIFIED: ICD-10-CM

## 2023-10-18 DIAGNOSIS — I10 ESSENTIAL (PRIMARY) HYPERTENSION: ICD-10-CM

## 2023-10-18 PROCEDURE — 99214 OFFICE O/P EST MOD 30 MIN: CPT | Mod: 95

## 2023-10-18 PROCEDURE — G0463: CPT

## 2023-12-06 ENCOUNTER — OUTPATIENT (OUTPATIENT)
Dept: OUTPATIENT SERVICES | Facility: HOSPITAL | Age: 53
LOS: 1 days | End: 2023-12-06
Payer: COMMERCIAL

## 2023-12-06 ENCOUNTER — APPOINTMENT (OUTPATIENT)
Dept: BARIATRICS/WEIGHT MGMT | Facility: CLINIC | Age: 53
End: 2023-12-06
Payer: COMMERCIAL

## 2023-12-06 DIAGNOSIS — I10 ESSENTIAL (PRIMARY) HYPERTENSION: ICD-10-CM

## 2023-12-06 PROCEDURE — 99214 OFFICE O/P EST MOD 30 MIN: CPT | Mod: 95

## 2023-12-06 PROCEDURE — G0463: CPT

## 2023-12-14 DIAGNOSIS — E66.9 OBESITY, UNSPECIFIED: ICD-10-CM

## 2023-12-14 DIAGNOSIS — E78.5 HYPERLIPIDEMIA, UNSPECIFIED: ICD-10-CM

## 2024-01-17 ENCOUNTER — APPOINTMENT (OUTPATIENT)
Dept: BARIATRICS/WEIGHT MGMT | Facility: CLINIC | Age: 54
End: 2024-01-17
Payer: COMMERCIAL

## 2024-01-17 VITALS — WEIGHT: 243 LBS | BODY MASS INDEX: 34.87 KG/M2

## 2024-01-17 PROCEDURE — 99213 OFFICE O/P EST LOW 20 MIN: CPT | Mod: 95

## 2024-01-17 NOTE — ASSESSMENT
[FreeTextEntry1] : Bariatric surgery history: none Overweight / obesity comorbidities: jayme hld htn Current anti-obesity medications: wegovy phentermine Obesity medication side effects: none  -reviewed the current and future options for meds -encouraged to continue to work on lifestyle -cont wegovy 1 mg, phentermine

## 2024-01-17 NOTE — HISTORY OF PRESENT ILLNESS
[Home] : at home, [unfilled] , at the time of the visit. [Other Location: e.g. Home (Enter Location, City,State)___] : at [unfilled] [Verbal consent obtained from patient] : the patient, [unfilled] [FreeTextEntry1] : Patient presents for weight loss and overweight/obese comorbidity management  reports doing well losing weight has been more active tolerating medication asks about new medications in the future  Due to comorbidities this patient requires medical treatment for overweight / obesity

## 2024-02-28 ENCOUNTER — APPOINTMENT (OUTPATIENT)
Dept: BARIATRICS/WEIGHT MGMT | Facility: CLINIC | Age: 54
End: 2024-02-28
Payer: COMMERCIAL

## 2024-02-28 PROCEDURE — 99214 OFFICE O/P EST MOD 30 MIN: CPT

## 2024-02-28 RX ORDER — SEMAGLUTIDE 2.4 MG/.75ML
2.4 INJECTION, SOLUTION SUBCUTANEOUS
Qty: 9 | Refills: 1 | Status: ACTIVE | COMMUNITY
Start: 2023-09-13 | End: 1900-01-01

## 2024-02-28 NOTE — HISTORY OF PRESENT ILLNESS
[Home] : at home, [unfilled] , at the time of the visit. [Other Location: e.g. Home (Enter Location, City,State)___] : at [unfilled] [Verbal consent obtained from patient] : the patient, [unfilled] [FreeTextEntry1] : Patient presents for weight loss and overweight/obese comorbidity management  doing well has started with  3x weekly has drifted off of ketogenic diet weight stable per report would like to incr wegovy would like to review nutrition a bit  Due to comorbidities this patient requires medical treatment for overweight / obesity

## 2024-02-28 NOTE — ASSESSMENT
[FreeTextEntry1] : Bariatric surgery history: none Overweight / obesity comorbidities: htn hld jayme Current anti-obesity medications: wegovy Obesity medication side effects: none  -key nutrition concepts reviewed, fiber, fat, S/V plate -encouraged to continue excellent exercise -incr wegovy to 1.7 mg

## 2024-04-11 ENCOUNTER — OUTPATIENT (OUTPATIENT)
Dept: OUTPATIENT SERVICES | Facility: HOSPITAL | Age: 54
LOS: 1 days | End: 2024-04-11
Payer: COMMERCIAL

## 2024-04-11 ENCOUNTER — APPOINTMENT (OUTPATIENT)
Dept: BARIATRICS/WEIGHT MGMT | Facility: CLINIC | Age: 54
End: 2024-04-11
Payer: COMMERCIAL

## 2024-04-11 DIAGNOSIS — G47.33 OBSTRUCTIVE SLEEP APNEA (ADULT) (PEDIATRIC): ICD-10-CM

## 2024-04-11 DIAGNOSIS — I10 ESSENTIAL (PRIMARY) HYPERTENSION: ICD-10-CM

## 2024-04-11 DIAGNOSIS — E78.5 HYPERLIPIDEMIA, UNSPECIFIED: ICD-10-CM

## 2024-04-11 DIAGNOSIS — E66.9 OBESITY, UNSPECIFIED: ICD-10-CM

## 2024-04-11 PROCEDURE — 99214 OFFICE O/P EST MOD 30 MIN: CPT | Mod: 95

## 2024-04-11 PROCEDURE — G0463: CPT

## 2024-04-11 RX ORDER — PHENTERMINE HYDROCHLORIDE 37.5 MG/1
37.5 TABLET ORAL
Qty: 30 | Refills: 5 | Status: ACTIVE | COMMUNITY
Start: 2023-03-28 | End: 1900-01-01

## 2024-04-11 RX ORDER — ONDANSETRON 4 MG/1
4 TABLET ORAL
Qty: 21 | Refills: 5 | Status: ACTIVE | COMMUNITY
Start: 2024-04-11 | End: 1900-01-01

## 2024-04-11 NOTE — ASSESSMENT
[FreeTextEntry1] : Bariatric surgery history: none Overweight / obesity comorbidities: htn hld jayme Current anti-obesity medications: wegovy phentermine Obesity medication side effects: nausea  reviewed excellent progress zofran for nausea cont wegovy 2.4 mg

## 2024-04-11 NOTE — HISTORY OF PRESENT ILLNESS
[Home] : at home, [unfilled] , at the time of the visit. [Other Location: e.g. Home (Enter Location, City,State)___] : at [unfilled] [Verbal consent obtained from patient] : the patient, [unfilled] [FreeTextEntry1] : Patient presents for weight loss and overweight/obese comorbidity management  doing well working out weight stable medication causing some nausea few vomiting episodes has run out of anti nausea meds  Due to comorbidities this patient requires medical treatment for overweight / obesity

## 2024-04-16 DIAGNOSIS — G47.33 OBSTRUCTIVE SLEEP APNEA (ADULT) (PEDIATRIC): ICD-10-CM

## 2024-04-16 DIAGNOSIS — E78.5 HYPERLIPIDEMIA, UNSPECIFIED: ICD-10-CM

## 2024-04-16 DIAGNOSIS — E66.9 OBESITY, UNSPECIFIED: ICD-10-CM

## 2024-06-13 ENCOUNTER — APPOINTMENT (OUTPATIENT)
Dept: BARIATRICS/WEIGHT MGMT | Facility: CLINIC | Age: 54
End: 2024-06-13